# Patient Record
Sex: MALE | Race: WHITE | NOT HISPANIC OR LATINO | URBAN - METROPOLITAN AREA
[De-identification: names, ages, dates, MRNs, and addresses within clinical notes are randomized per-mention and may not be internally consistent; named-entity substitution may affect disease eponyms.]

---

## 2020-02-06 ENCOUNTER — OFFICE (OUTPATIENT)
Dept: URBAN - METROPOLITAN AREA CLINIC 11 | Facility: CLINIC | Age: 65
End: 2020-02-06

## 2020-02-06 VITALS
DIASTOLIC BLOOD PRESSURE: 88 MMHG | DIASTOLIC BLOOD PRESSURE: 89 MMHG | SYSTOLIC BLOOD PRESSURE: 150 MMHG | WEIGHT: 206 LBS | SYSTOLIC BLOOD PRESSURE: 171 MMHG | HEIGHT: 69 IN | HEART RATE: 67 BPM

## 2020-02-06 DIAGNOSIS — K52.89 OTHER SPECIFIED NONINFECTIVE GASTROENTERITIS AND COLITIS: ICD-10-CM

## 2020-02-06 PROCEDURE — 99203 OFFICE O/P NEW LOW 30 MIN: CPT | Performed by: INTERNAL MEDICINE

## 2020-02-06 NOTE — SERVICEHPINOTES
Patient is a 64-year-old  man with a history of MI and cardiac stents (previously cared for by Dr. Lewis, now transitioning to Dr. Araujo since his daughter works with Dr. Araujo), who presents for initial evaluation of non-bloody diarrhea.  Patient reports that he still has 1 bowel movement a day in the morning, but it is mostly loose with minimal form to it.  This has been occurring for the last couple of months.  Prior to this, patient used to have 1 formed bowel movement everyday.  Patient does not have any associated abdominal pain or GI bleeding.  No associated weight loss, or decreased appetite.  Patient last colonoscopy was in December 2015,  and aside from diverticulosis and small hemorrhoids, it was unremarkable.  Repeat was recommended in 10 years.  Patient does not have a family history of colorectal cancer.Patient used to be a competitive runner, but he has had to stop running in the last couple of years.  He currently uses the elliptical.  He recently had an MRI done for his left knee, and he has an orthopedic follow-up tomorrow. Of note, patient mentions that he drinks 4-6 cans of light beer a day, and this has been his longstanding pattern.  No prior history of pancreatitis.  Patient does not have any history of any recent antibiotics, sick contacts, or any recent travel.

## 2020-02-07 ENCOUNTER — OFFICE (OUTPATIENT)
Dept: URBAN - METROPOLITAN AREA CLINIC 11 | Facility: CLINIC | Age: 65
End: 2020-02-07

## 2020-02-07 LAB
C DIFFICILE TOXINS A+B, EIA: NEGATIVE
FECAL FAT, QUALITATIVE: FATS, NEUTRAL: NORMAL
FECAL FAT, QUALITATIVE: FATS, TOTAL: (no result)
PANCREATIC ELASTASE, FECAL: 159 UG ELAST./G — LOW (ref 200–?)
RESULT: RESULT 1: (no result)
STOOL CULTURE: CAMPYLOBACTER CULTURE: (no result)
STOOL CULTURE: E COLI SHIGA TOXIN EIA: NEGATIVE
STOOL CULTURE: SALMONELLA/SHIGELLA SCREEN: (no result)
WHITE BLOOD CELLS (WBC), STOOL: (no result)

## 2020-02-18 ENCOUNTER — OFFICE (OUTPATIENT)
Dept: URBAN - METROPOLITAN AREA CLINIC 10 | Facility: CLINIC | Age: 65
End: 2020-02-18

## 2020-05-12 ENCOUNTER — OFFICE (OUTPATIENT)
Dept: URBAN - METROPOLITAN AREA CLINIC 10 | Facility: CLINIC | Age: 65
End: 2020-05-12

## 2020-05-12 VITALS
HEART RATE: 82 BPM | WEIGHT: 206 LBS | DIASTOLIC BLOOD PRESSURE: 83 MMHG | HEIGHT: 69 IN | SYSTOLIC BLOOD PRESSURE: 143 MMHG

## 2020-05-12 DIAGNOSIS — K86.89 OTHER SPECIFIED DISEASES OF PANCREAS: ICD-10-CM

## 2020-05-12 PROCEDURE — 99214 OFFICE O/P EST MOD 30 MIN: CPT | Performed by: INTERNAL MEDICINE

## 2023-07-25 ENCOUNTER — OFFICE (OUTPATIENT)
Dept: URBAN - METROPOLITAN AREA CLINIC 10 | Facility: CLINIC | Age: 68
End: 2023-07-25
Payer: COMMERCIAL

## 2023-07-25 VITALS
DIASTOLIC BLOOD PRESSURE: 69 MMHG | HEART RATE: 68 BPM | WEIGHT: 197 LBS | OXYGEN SATURATION: 96 % | SYSTOLIC BLOOD PRESSURE: 131 MMHG | HEIGHT: 69 IN

## 2023-07-25 DIAGNOSIS — R19.7 DIARRHEA, UNSPECIFIED: ICD-10-CM

## 2023-07-25 DIAGNOSIS — D72.19 OTHER EOSINOPHILIA: ICD-10-CM

## 2023-07-25 PROCEDURE — 99214 OFFICE O/P EST MOD 30 MIN: CPT | Performed by: INTERNAL MEDICINE
